# Patient Record
Sex: FEMALE | Race: WHITE | NOT HISPANIC OR LATINO | Employment: PART TIME | URBAN - METROPOLITAN AREA
[De-identification: names, ages, dates, MRNs, and addresses within clinical notes are randomized per-mention and may not be internally consistent; named-entity substitution may affect disease eponyms.]

---

## 2017-03-08 ENCOUNTER — TELEPHONE (OUTPATIENT)
Dept: ORTHOPEDICS | Facility: CLINIC | Age: 22
End: 2017-03-08

## 2017-03-08 NOTE — TELEPHONE ENCOUNTER
----- Message from Hoda Jason sent at 3/8/2017 10:55 AM CST -----  Contact: pt  Call pt grand mother Latesha Bal at [phone]//pt went to [facility] Friday nite she has medicaid she has a broken left leg ankle bone are broken///,and need to see an orthopedic//thks ht

## 2017-03-08 NOTE — TELEPHONE ENCOUNTER
Spoke to pt asked her if she had insurance pt stated she filled out paper work for it but has not received anything in the mail yet. I informed pt without insurance she will have to pay 500$ deposit. Pt verbalized she understood. No appointment scheduled.

## 2017-03-08 NOTE — TELEPHONE ENCOUNTER
Spoke to pt grand mother informed her due to pt not having her in the chart I can not give out any information.